# Patient Record
Sex: MALE | Race: WHITE | Employment: OTHER | URBAN - NONMETROPOLITAN AREA
[De-identification: names, ages, dates, MRNs, and addresses within clinical notes are randomized per-mention and may not be internally consistent; named-entity substitution may affect disease eponyms.]

---

## 2018-10-02 ENCOUNTER — OFFICE VISIT (OUTPATIENT)
Dept: FAMILY MEDICINE CLINIC | Age: 63
End: 2018-10-02
Payer: COMMERCIAL

## 2018-10-02 VITALS
OXYGEN SATURATION: 98 % | TEMPERATURE: 97 F | WEIGHT: 140.6 LBS | RESPIRATION RATE: 16 BRPM | BODY MASS INDEX: 24.01 KG/M2 | DIASTOLIC BLOOD PRESSURE: 62 MMHG | HEART RATE: 90 BPM | SYSTOLIC BLOOD PRESSURE: 114 MMHG | HEIGHT: 64 IN

## 2018-10-02 DIAGNOSIS — M79.622 LEFT AXILLARY PAIN: ICD-10-CM

## 2018-10-02 DIAGNOSIS — L02.412 ABSCESS OF AXILLA, LEFT: Primary | ICD-10-CM

## 2018-10-02 PROCEDURE — 99203 OFFICE O/P NEW LOW 30 MIN: CPT | Performed by: FAMILY MEDICINE

## 2018-10-02 RX ORDER — OXYCODONE AND ACETAMINOPHEN 7.5; 325 MG/1; MG/1
1 TABLET ORAL EVERY 4 HOURS PRN
Qty: 18 TABLET | Refills: 0 | Status: SHIPPED | OUTPATIENT
Start: 2018-10-02 | End: 2018-10-05

## 2018-10-02 RX ORDER — SULFAMETHOXAZOLE AND TRIMETHOPRIM 800; 160 MG/1; MG/1
1 TABLET ORAL 2 TIMES DAILY
Qty: 20 TABLET | Refills: 0 | Status: SHIPPED | OUTPATIENT
Start: 2018-10-02 | End: 2018-10-12

## 2018-10-02 RX ORDER — OXYCODONE AND ACETAMINOPHEN 7.5; 325 MG/1; MG/1
TABLET ORAL
Refills: 0 | COMMUNITY
Start: 2018-09-25 | End: 2018-10-02 | Stop reason: SDUPTHER

## 2018-10-02 RX ORDER — SULFAMETHOXAZOLE AND TRIMETHOPRIM 400; 80 MG/1; MG/1
1 TABLET ORAL 2 TIMES DAILY
COMMUNITY
End: 2018-10-02

## 2018-10-02 ASSESSMENT — ENCOUNTER SYMPTOMS
SHORTNESS OF BREATH: 0
CONSTIPATION: 0
NAUSEA: 0
VOMITING: 0
ABDOMINAL PAIN: 0
DIARRHEA: 0
COUGH: 0
WHEEZING: 0
BACK PAIN: 0
RHINORRHEA: 0

## 2018-10-02 ASSESSMENT — PATIENT HEALTH QUESTIONNAIRE - PHQ9
SUM OF ALL RESPONSES TO PHQ QUESTIONS 1-9: 0
SUM OF ALL RESPONSES TO PHQ9 QUESTIONS 1 & 2: 0
SUM OF ALL RESPONSES TO PHQ QUESTIONS 1-9: 0
2. FEELING DOWN, DEPRESSED OR HOPELESS: 0
1. LITTLE INTEREST OR PLEASURE IN DOING THINGS: 0

## 2018-10-02 NOTE — PATIENT INSTRUCTIONS
call for help? Call your doctor now or seek immediate medical care if:    · You have signs of worsening infection, such as:  ¨ Increased pain, swelling, warmth, or redness. ¨ Red streaks leading from the infected skin. ¨ Pus draining from the wound. ¨ A fever.    Watch closely for changes in your health, and be sure to contact your doctor if:    · You do not get better as expected. Where can you learn more? Go to [email or web address]://[email or web address].[email or web address] [email or web address] org and sign in to your [employer] account. Enter W399 in the Sootoo.[employer] box to learn more about \"Skin Abscess: Care Instructions. \"     If you do not have an account, please click on the \"Sign Up Now\" link. Current as of: May 10, 2017  Content Version: 11.7  © 2247-5681 [employer]. Care instructions adapted under license by [facility] [place] ([facility]). If you have questions about a medical condition or this instruction, always ask your healthcare professional. [place] [address] any warranty or liability for your use of this information.

## 2018-10-08 ENCOUNTER — OFFICE VISIT (OUTPATIENT)
Dept: FAMILY MEDICINE CLINIC | Age: 63
End: 2018-10-08
Payer: COMMERCIAL

## 2018-10-08 VITALS
HEART RATE: 86 BPM | DIASTOLIC BLOOD PRESSURE: 84 MMHG | RESPIRATION RATE: 16 BRPM | BODY MASS INDEX: 23.9 KG/M2 | OXYGEN SATURATION: 98 % | TEMPERATURE: 98.4 F | HEIGHT: 64 IN | WEIGHT: 140 LBS | SYSTOLIC BLOOD PRESSURE: 136 MMHG

## 2018-10-08 DIAGNOSIS — L02.412 ABSCESS OF AXILLA, LEFT: Primary | ICD-10-CM

## 2018-10-08 PROCEDURE — 99213 OFFICE O/P EST LOW 20 MIN: CPT | Performed by: FAMILY MEDICINE

## 2018-10-08 ASSESSMENT — ENCOUNTER SYMPTOMS
COUGH: 0
DIARRHEA: 0
VOMITING: 0
ABDOMINAL PAIN: 0
RHINORRHEA: 0
BACK PAIN: 0
CONSTIPATION: 0
SHORTNESS OF BREATH: 0
WHEEZING: 0
NAUSEA: 0

## 2019-10-30 ENCOUNTER — OFFICE VISIT (OUTPATIENT)
Dept: FAMILY MEDICINE CLINIC | Age: 64
End: 2019-10-30
Payer: COMMERCIAL

## 2019-10-30 VITALS
BODY MASS INDEX: 24.92 KG/M2 | HEIGHT: 64 IN | HEART RATE: 98 BPM | OXYGEN SATURATION: 97 % | SYSTOLIC BLOOD PRESSURE: 130 MMHG | DIASTOLIC BLOOD PRESSURE: 92 MMHG | TEMPERATURE: 98.7 F | WEIGHT: 146 LBS | RESPIRATION RATE: 16 BRPM

## 2019-10-30 DIAGNOSIS — Z12.11 SCREENING FOR MALIGNANT NEOPLASM OF COLON: ICD-10-CM

## 2019-10-30 DIAGNOSIS — M54.2 CHRONIC NECK AND BACK PAIN: Primary | ICD-10-CM

## 2019-10-30 DIAGNOSIS — M62.838 MUSCLE SPASM: ICD-10-CM

## 2019-10-30 DIAGNOSIS — M54.9 CHRONIC NECK AND BACK PAIN: Primary | ICD-10-CM

## 2019-10-30 DIAGNOSIS — T14.8XXA MUSCLE STRAIN: ICD-10-CM

## 2019-10-30 DIAGNOSIS — N52.9 ERECTILE DYSFUNCTION, UNSPECIFIED ERECTILE DYSFUNCTION TYPE: ICD-10-CM

## 2019-10-30 DIAGNOSIS — G89.29 CHRONIC NECK AND BACK PAIN: Primary | ICD-10-CM

## 2019-10-30 PROCEDURE — 99214 OFFICE O/P EST MOD 30 MIN: CPT | Performed by: FAMILY MEDICINE

## 2019-10-30 RX ORDER — SILDENAFIL 100 MG/1
100 TABLET, FILM COATED ORAL PRN
Qty: 15 TABLET | Refills: 3 | Status: SHIPPED | OUTPATIENT
Start: 2019-10-30

## 2019-10-30 RX ORDER — NAPROXEN 500 MG/1
500 TABLET ORAL 2 TIMES DAILY WITH MEALS
Qty: 60 TABLET | Refills: 1 | Status: SHIPPED | OUTPATIENT
Start: 2019-10-30

## 2019-10-30 RX ORDER — TIZANIDINE 4 MG/1
4 TABLET ORAL 3 TIMES DAILY
Qty: 60 TABLET | Refills: 0 | Status: SHIPPED | OUTPATIENT
Start: 2019-10-30 | End: 2019-11-19

## 2019-10-30 ASSESSMENT — PATIENT HEALTH QUESTIONNAIRE - PHQ9
1. LITTLE INTEREST OR PLEASURE IN DOING THINGS: 0
2. FEELING DOWN, DEPRESSED OR HOPELESS: 0
SUM OF ALL RESPONSES TO PHQ QUESTIONS 1-9: 0
SUM OF ALL RESPONSES TO PHQ QUESTIONS 1-9: 0
SUM OF ALL RESPONSES TO PHQ9 QUESTIONS 1 & 2: 0

## 2019-11-11 ASSESSMENT — ENCOUNTER SYMPTOMS
EYE DISCHARGE: 0
EYE PAIN: 0
NAUSEA: 0
COUGH: 0
SHORTNESS OF BREATH: 0
RHINORRHEA: 0
ABDOMINAL PAIN: 0
CONSTIPATION: 0
BACK PAIN: 1
VOMITING: 0
DIARRHEA: 0

## 2020-02-03 ENCOUNTER — OFFICE VISIT (OUTPATIENT)
Dept: FAMILY MEDICINE CLINIC | Age: 65
End: 2020-02-03
Payer: COMMERCIAL

## 2020-02-03 VITALS
TEMPERATURE: 98.6 F | HEART RATE: 99 BPM | DIASTOLIC BLOOD PRESSURE: 98 MMHG | WEIGHT: 145 LBS | BODY MASS INDEX: 24.75 KG/M2 | SYSTOLIC BLOOD PRESSURE: 160 MMHG | OXYGEN SATURATION: 96 % | RESPIRATION RATE: 16 BRPM | HEIGHT: 64 IN

## 2020-02-03 PROCEDURE — 99213 OFFICE O/P EST LOW 20 MIN: CPT | Performed by: FAMILY MEDICINE

## 2020-02-03 RX ORDER — ONDANSETRON 4 MG/1
4 TABLET, ORALLY DISINTEGRATING ORAL 3 TIMES DAILY PRN
Qty: 30 TABLET | Refills: 0 | Status: SHIPPED | OUTPATIENT
Start: 2020-02-03

## 2020-02-03 ASSESSMENT — PATIENT HEALTH QUESTIONNAIRE - PHQ9
SUM OF ALL RESPONSES TO PHQ QUESTIONS 1-9: 0
1. LITTLE INTEREST OR PLEASURE IN DOING THINGS: 0
2. FEELING DOWN, DEPRESSED OR HOPELESS: 0
SUM OF ALL RESPONSES TO PHQ9 QUESTIONS 1 & 2: 0
SUM OF ALL RESPONSES TO PHQ QUESTIONS 1-9: 0

## 2020-02-03 ASSESSMENT — ENCOUNTER SYMPTOMS
CONSTIPATION: 0
DIARRHEA: 1
ABDOMINAL PAIN: 0
EYE DISCHARGE: 0
RHINORRHEA: 0
BACK PAIN: 0
COUGH: 0
EYE PAIN: 0
NAUSEA: 1
VOMITING: 1
SHORTNESS OF BREATH: 0

## 2020-02-03 NOTE — PROGRESS NOTES
Chase Narayan is a 59 y.o. male who presents today for   Chief Complaint   Patient presents with    Nausea & Vomiting     did not work on friday and today    Diarrhea    Headache       Chief Complaint: Vomiting    HPI  Patient reports that a pascual at work tested positive for the flu and presented to work and he believes he got sick from them as Friday morning he woke up and felt sick in his stomach but attempted to go to work but on the way to work he started having issues with nausea vomiting and when he got back home continue to have more issues with nausea vomiting and started having some diarrhea. He continued to have those symptoms into Sunday but did feel little bit better this morning but was still having a little bit of the nausea and vomiting and some diarrhea resulting in him not presenting to work. He is unsure if he has had a fever as he does not have a thermometer has not checked it. He has not been take anything for his symptoms and denies any specific aggravating or relieving factors for symptoms he has attempted to stay well-hydrated with the use of water and orange juice. He denies any suspicious foods and does also report that his roommate also has similar sick symptoms that started after his symptoms were to going on. Review of Systems   Constitutional: Negative for activity change, appetite change, fatigue and fever. HENT: Negative for congestion and rhinorrhea. Eyes: Negative for pain and discharge. Respiratory: Negative for cough and shortness of breath. Cardiovascular: Negative for chest pain and palpitations. Gastrointestinal: Positive for diarrhea, nausea and vomiting. Negative for abdominal pain and constipation. Endocrine: Negative for cold intolerance and heat intolerance. Genitourinary: Negative for dysuria and hematuria. Musculoskeletal: Positive for myalgias. Negative for back pain, gait problem and neck pain. Skin: Negative for rash and wound.        No past medical history on file. Current Outpatient Medications   Medication Sig Dispense Refill    ondansetron (ZOFRAN-ODT) 4 MG disintegrating tablet Take 1 tablet by mouth 3 times daily as needed for Nausea or Vomiting 30 tablet 0    naproxen (NAPROSYN) 500 MG tablet Take 1 tablet by mouth 2 times daily (with meals) 60 tablet 1    sildenafil (VIAGRA) 100 MG tablet Take 1 tablet by mouth as needed for Erectile Dysfunction 15 tablet 3     No current facility-administered medications for this visit. No Known Allergies    Past Surgical History:   Procedure Laterality Date    HERNIA REPAIR         Social History     Tobacco Use    Smoking status: Current Every Day Smoker     Packs/day: 0.50     Years: 50.00     Pack years: 25.00     Types: Cigarettes    Smokeless tobacco: Never Used   Substance Use Topics    Alcohol use: Yes     Comment: soc    Drug use: No       Family History   Family history unknown: Yes       BP (!) 160/98 (Site: Left Upper Arm, Position: Sitting, Cuff Size: Medium Adult)   Pulse 99   Temp 98.6 °F (37 °C) (Oral)   Resp 16   Ht 5' 4\" (1.626 m)   Wt 145 lb (65.8 kg)   SpO2 96%   BMI 24.89 kg/m²     Physical Exam  Vitals signs and nursing note reviewed. Constitutional:       General: He is not in acute distress. Appearance: Normal appearance. He is well-developed. He is not diaphoretic. HENT:      Head: Normocephalic and atraumatic. Right Ear: External ear normal.      Left Ear: External ear normal.      Nose: Nose normal.      Mouth/Throat:      Mouth: Mucous membranes are moist.      Pharynx: Oropharynx is clear. No oropharyngeal exudate. Eyes:      General: No scleral icterus. Right eye: No discharge. Left eye: No discharge. Conjunctiva/sclera: Conjunctivae normal.   Neck:      Musculoskeletal: Normal range of motion and neck supple. No muscular tenderness. Trachea: No tracheal deviation.    Cardiovascular:      Rate and Rhythm: Normal rate understanding and agreement with plan as discussed. No orders of the defined types were placed in this encounter. Orders Placed This Encounter   Medications    ondansetron (ZOFRAN-ODT) 4 MG disintegrating tablet     Sig: Take 1 tablet by mouth 3 times daily as needed for Nausea or Vomiting     Dispense:  30 tablet     Refill:  0     There are no discontinued medications. Patient Instructions   Patient given educational handouts and has had all questions answered. Patient voices understanding and agrees to plans along with risks and benefits of plan. Patient is instructed to continue prior meds,diet, and exercise plans as instructed. Patient agrees to follow up as instructed and sooner if needed. Patient agrees to go to ER if condition becomes emergent. Return if symptoms worsen or fail to improve, for next scheduled follow up with PCP.

## 2020-02-03 NOTE — PATIENT INSTRUCTIONS
· You have new or worsening belly pain.     · You have a new or higher fever.     · You vomit blood or what looks like coffee grounds.    Watch closely for changes in your health, and be sure to contact your doctor if:    · You have ongoing nausea and vomiting.     · Your vomiting is getting worse.     · Your vomiting lasts longer than 2 days.     · You are not getting better as expected. Where can you learn more? Go to https://Nov[employer] HealthpeCityboteb.InvenQuery. org and sign in to your [employer] account. Enter 25 [id number] in the [employer] box to learn more about \"Nausea and Vomiting: Care Instructions. \"     If you do not have an account, please click on the \"Sign Up Now\" link. Current as of: June 26, 2019  Content Version: 12.3  © 6459-1890 [employer]. Care instructions adapted under license by [facility] ([facility]). If you have questions about a medical condition or this instruction, always ask your healthcare professional. [place] [address] any warranty or liability for your use of this information.

## 2020-02-03 NOTE — LETTER
[facility]  [address] [id number]  Phone: [phone]  Fax: [phone]    Landry Mackay MD        February 3, 2020     Patient: Darwin Mendoza   YOB: 1955   Date of Visit: 2/3/2020       To Whom it May Concern:    Darwin Mendoza was seen in my clinic on 2/3/2020. He may return to work on 2/5/20. If you have any questions or concerns, please don't hesitate to call.     Sincerely,         Landry Mackay MD